# Patient Record
Sex: MALE | Race: WHITE | NOT HISPANIC OR LATINO | Employment: UNEMPLOYED | URBAN - METROPOLITAN AREA
[De-identification: names, ages, dates, MRNs, and addresses within clinical notes are randomized per-mention and may not be internally consistent; named-entity substitution may affect disease eponyms.]

---

## 2019-04-07 ENCOUNTER — HOSPITAL ENCOUNTER (EMERGENCY)
Facility: HOSPITAL | Age: 54
Discharge: HOME | End: 2019-04-07
Attending: EMERGENCY MEDICINE
Payer: OTHER MISCELLANEOUS

## 2019-04-07 VITALS
DIASTOLIC BLOOD PRESSURE: 81 MMHG | TEMPERATURE: 99 F | RESPIRATION RATE: 20 BRPM | BODY MASS INDEX: 25.77 KG/M2 | OXYGEN SATURATION: 98 % | SYSTOLIC BLOOD PRESSURE: 132 MMHG | HEIGHT: 70 IN | WEIGHT: 180 LBS | HEART RATE: 65 BPM

## 2019-04-07 DIAGNOSIS — G57.02 SCIATIC NERVE DISEASE, LEFT: Primary | ICD-10-CM

## 2019-04-07 PROCEDURE — 96372 THER/PROPH/DIAG INJ SC/IM: CPT

## 2019-04-07 PROCEDURE — 99284 EMERGENCY DEPT VISIT MOD MDM: CPT | Mod: 25

## 2019-04-07 PROCEDURE — 63600175 PHARM REV CODE 636 W HCPCS: Performed by: NURSE PRACTITIONER

## 2019-04-07 RX ORDER — KETOROLAC TROMETHAMINE 30 MG/ML
15 INJECTION, SOLUTION INTRAMUSCULAR; INTRAVENOUS
Status: COMPLETED | OUTPATIENT
Start: 2019-04-07 | End: 2019-04-07

## 2019-04-07 RX ORDER — ORPHENADRINE CITRATE 30 MG/ML
60 INJECTION INTRAMUSCULAR; INTRAVENOUS
Status: COMPLETED | OUTPATIENT
Start: 2019-04-07 | End: 2019-04-07

## 2019-04-07 RX ORDER — NAPROXEN 500 MG/1
500 TABLET ORAL 2 TIMES DAILY WITH MEALS
Qty: 60 TABLET | Refills: 0 | Status: SHIPPED | OUTPATIENT
Start: 2019-04-07

## 2019-04-07 RX ADMIN — KETOROLAC TROMETHAMINE 15 MG: 30 INJECTION, SOLUTION INTRAMUSCULAR at 10:04

## 2019-04-07 RX ADMIN — ORPHENADRINE CITRATE 60 MG: 60 INJECTION INTRAMUSCULAR; INTRAVENOUS at 10:04

## 2019-04-07 NOTE — ED PROVIDER NOTES
Encounter Date: 4/7/2019    SCRIBE #1 NOTE: I, Rajat Garza, am scribing for, and in the presence of, Michelle RIVERA.       History     Chief Complaint   Patient presents with    Hip Pain     moving beams thursday reports lt hip and leg pain     Time seen by provider: 9:39 AM on 04/07/2019      Porter Yip is a 54 y.o. male with a PMHx of prostate cancer who presents to the ED for further evaluation of left hip pain that started 3 days ago. He states that he lifted a beam 3 days ago and when lifting felt a pinch in his left hip and buttock region. Patient reports that initially the pain was not intense and was minimal. He relays that it has gradually worsened and the pain is in the left hip and buttock that radiates down into his foot. He states that he has some tingling in his left foot as well. Patient denies weakness, incontinence of urine or stool, fever, rash, or any other compliant. The patient has a PSHx of prostatectomy. No IVDA, no fevers.          The history is provided by the patient.     Review of patient's allergies indicates:  No Known Allergies  No past medical history on file.  No past surgical history on file.  No family history on file.  Social History     Tobacco Use    Smoking status: Not on file   Substance Use Topics    Alcohol use: Not on file    Drug use: Not on file     Review of Systems   Constitutional: Negative for fever.   HENT: Negative for congestion.    Respiratory: Negative for shortness of breath.    Cardiovascular: Negative for chest pain.   Gastrointestinal: Negative for abdominal pain, diarrhea, nausea and vomiting.   Genitourinary: Negative for difficulty urinating and enuresis.        No fecal or urinary incontinence   Musculoskeletal: Positive for arthralgias.   Skin: Negative for rash.   Neurological: Positive for numbness. Negative for weakness.   Hematological: Does not bruise/bleed easily.       Physical Exam     Initial Vitals [04/07/19 0935]   BP Pulse Resp  Temp SpO2   132/81 65 20 98.7 °F (37.1 °C) 98 %      MAP       --         Physical Exam    Nursing note and vitals reviewed.  Constitutional: Vital signs are normal. He appears well-developed and well-nourished.   HENT:   Head: Normocephalic and atraumatic.   Eyes: Pupils are equal, round, and reactive to light.   Neck: Neck supple.   Cardiovascular: Normal rate, regular rhythm, normal heart sounds and intact distal pulses. Exam reveals no gallop and no friction rub.    No murmur heard.  Pulses:       Dorsalis pedis pulses are 2+ on the left side.   Pulmonary/Chest: Breath sounds normal. He has no wheezes. He has no rhonchi. He has no rales.   Abdominal: Soft. Normal appearance and bowel sounds are normal. There is no tenderness.   Musculoskeletal: He exhibits tenderness.        Left hip: Normal.        Cervical back: Normal.        Thoracic back: Normal.        Lumbar back: He exhibits tenderness and pain. He exhibits normal range of motion, no bony tenderness, no swelling, no edema, no deformity, no laceration, no spasm and normal pulse.        Back:    Left paraspinal lumbar spinal tenderness. No C, T, and L midline spinal tenderness.    Neurological: He is alert and oriented to person, place, and time. He has normal strength.   5/5 strength and normal sensation in the bilateral lower extremities.   Skin: Skin is warm, dry and intact.   Psychiatric: He has a normal mood and affect. His speech is normal and behavior is normal.         ED Course   Procedures  Labs Reviewed - No data to display       Imaging Results    None          Medical Decision Making:   History:   Old Medical Records: I decided to obtain old medical records.  Differential Diagnosis:   Contusion  Lumbar strain  Sciatica   Lumbar radiculopathy       APC / Resident Notes:   Patient is a 54 y.o. male who presents to the ED 04/07/2019 who underwent emergent evaluation for left hip pain. Patient on exam is ambulatory with normal ROM of left hip with  no pain with ROM of left hip. Patient has pain and tenderness in left paraspinal lumbar spine radiating into buttocks consistent with sciatica. He has 5/5 strength and normal sensation in bi lower extremities. The patient has no history of major trauma.  The patient's age is not greater than 70 or less than 20 years old. I do not suspect an infectious, cancerous, or vascular etiology as the cause of the low back pain. The patient does not have a history of cancer or unexplained weight loss suggesting metastatic disease.  The patient does not have persistent fevers or night sweats. The patient is not immunocompromised. He denies IV drug use.  I do not think this patient has an epidural abscess, osteomyelitis, or metastatic spine lesions.  Pt does not have a history of  aortic aneurysm and I do not think there is evidence of retroperitoneal rupture.  The patient has a normal neurological exam and does not show evidence of cord or root compression.  The patient does not exhibit signs of urinary retention, urinary incontinence, bowel incontinence, or saddle anesthesia.  There is no evidence of cauda equine syndrome.      I do not think emergent radiography with X-rays or CT scan or MRI is warranted at this  time. Outpatient imaging can be obtained at the discretion of the primary care Physician.     Pt's symptoms were treated and improved with pain medication.     I will offer this patient symptomatic treatment, reassurance, and education. Discussed close follow up care with PCP; patient verbalized understanding; case discussed with Dr. Lundy who is agreeable to plan of care.      Pt agrees with assessment, disposition and treatment plan; all questions answered.               Scribe Attestation:   Scribe #1: I performed the above scribed service and the documentation accurately describes the services I performed. I attest to the accuracy of the note.    Attending Attestation:     Physician Attestation Statement for  NP/PA:   I discussed this assessment and plan of this patient with the NP/PA, but I did not personally examine the patient. The face to face encounter was performed by the NP/PA.        Physician Attestation for Scribe:  Physician Attestation Statement for Aliviaibe #1: I, Michelle Chandra, reviewed documentation, as scribed by in my presence, and it is both accurate and complete.     Comments: I, INOCENTE ChauhanC, personally performed the services described in this documentation. All medical record entries made by the scribe were at my direction and in my presence.  I have reviewed the chart and agree that the record reflects my personal performance and is accurate and complete. MIGUEL Chauhan.  9:21 PM 04/07/2019 e               Clinical Impression:       ICD-10-CM ICD-9-CM   1. Sciatic nerve disease, left G57.02 355.0         Disposition:   Disposition: Discharged  Condition: Stable                        Michelle Chandra NP  04/07/19 2122       David Lundy MD  04/09/19 1146

## 2019-05-28 ENCOUNTER — TELEPHONE (OUTPATIENT)
Dept: SPINE | Facility: CLINIC | Age: 54
End: 2019-05-28

## 2019-05-28 NOTE — TELEPHONE ENCOUNTER
----- Message from Coby Keen sent at 5/28/2019  9:43 AM CDT -----  Contact: self [phone]  [facility] is not offering an appt for him at all.  I think it is due to the insurance.  He has 2 lumbar herniated discs, he is in pain. He said his pain level is significant.  Please call him to schedule an appt.  He is faxing a copy of the MRI report and will bring the disc to the appt.  Thank you!

## 2019-05-28 NOTE — TELEPHONE ENCOUNTER
Spoke with patient and scheduled him an appointment to see Michelle Bernal 6-17-19, he indicated understanding.

## 2019-05-29 ENCOUNTER — OFFICE VISIT (OUTPATIENT)
Dept: SPINE | Facility: CLINIC | Age: 54
End: 2019-05-29
Payer: MEDICAID

## 2019-05-29 VITALS
SYSTOLIC BLOOD PRESSURE: 118 MMHG | DIASTOLIC BLOOD PRESSURE: 81 MMHG | BODY MASS INDEX: 25.79 KG/M2 | HEIGHT: 70 IN | WEIGHT: 180.13 LBS | HEART RATE: 83 BPM

## 2019-05-29 DIAGNOSIS — M54.16 LUMBAR RADICULOPATHY: ICD-10-CM

## 2019-05-29 DIAGNOSIS — M51.26 HERNIATED LUMBAR INTERVERTEBRAL DISC: Primary | ICD-10-CM

## 2019-05-29 PROCEDURE — 99999 PR PBB SHADOW E&M-EST. PATIENT-LVL IV: ICD-10-PCS | Mod: PBBFAC,,, | Performed by: PHYSICIAN ASSISTANT

## 2019-05-29 PROCEDURE — 99204 PR OFFICE/OUTPT VISIT, NEW, LEVL IV, 45-59 MIN: ICD-10-PCS | Mod: S$PBB,,, | Performed by: PHYSICIAN ASSISTANT

## 2019-05-29 PROCEDURE — 99999 PR PBB SHADOW E&M-EST. PATIENT-LVL IV: CPT | Mod: PBBFAC,,, | Performed by: PHYSICIAN ASSISTANT

## 2019-05-29 PROCEDURE — 99204 OFFICE O/P NEW MOD 45 MIN: CPT | Mod: S$PBB,,, | Performed by: PHYSICIAN ASSISTANT

## 2019-05-29 PROCEDURE — 99214 OFFICE O/P EST MOD 30 MIN: CPT | Mod: PBBFAC,PN | Performed by: PHYSICIAN ASSISTANT

## 2019-05-29 RX ORDER — CYCLOBENZAPRINE HCL 5 MG
5 TABLET ORAL
COMMUNITY
Start: 2019-05-09 | End: 2019-06-06 | Stop reason: ALTCHOICE

## 2019-05-29 RX ORDER — METHYLPREDNISOLONE 4 MG/1
TABLET ORAL
Qty: 1 PACKAGE | Refills: 0 | Status: SHIPPED | OUTPATIENT
Start: 2019-05-29 | End: 2019-06-19

## 2019-05-29 RX ORDER — GABAPENTIN 400 MG/1
CAPSULE ORAL
COMMUNITY
Start: 2019-05-09 | End: 2021-05-25 | Stop reason: CLARIF

## 2019-05-29 RX ORDER — OMEPRAZOLE 20 MG/1
CAPSULE, DELAYED RELEASE ORAL
COMMUNITY
Start: 2019-05-13

## 2019-05-29 RX ORDER — IBUPROFEN 800 MG/1
800 TABLET ORAL 3 TIMES DAILY
COMMUNITY
Start: 2019-05-09

## 2019-05-30 ENCOUNTER — CLINICAL SUPPORT (OUTPATIENT)
Dept: REHABILITATION | Facility: HOSPITAL | Age: 54
End: 2019-05-30
Payer: MEDICAID

## 2019-05-30 DIAGNOSIS — M51.26 HERNIATED LUMBAR INTERVERTEBRAL DISC: Primary | ICD-10-CM

## 2019-05-30 DIAGNOSIS — M54.16 LUMBAR RADICULOPATHY: ICD-10-CM

## 2019-05-30 PROCEDURE — 97161 PT EVAL LOW COMPLEX 20 MIN: CPT | Mod: PN

## 2019-05-30 NOTE — PLAN OF CARE
TIME RECORD    Date: 05/30/2019    Start Time:  0800  Stop Time:  0845    PROCEDURES:    TIMED  Procedure Time Min.    Start:  Stop:     Start:  Stop:     Start:  Stop:     Start:  Stop:          UNTIMED  Procedure Time Min.   EVAL Start:  Stop:     Start:  Stop:      Total Timed Minutes:    Total Timed Units:    Total Untimed Units:  1  Charges Billed/# of units:  1    OUTPATIENT PHYSICAL THERAPY   PATIENT EVALUATION  Onset Date: 4/4/19 DOI.  Primary Diagnosis:   1. Herniated lumbar intervertebral disc     2. Lumbar radiculopathy       Treatment Diagnosis: LBP.  No past medical history on file.  Precautions: Hx of Ca,no US.  Prior Therapy: No.  Medications: Porter Yip has a current medication list which includes the following prescription(s): cyclobenzaprine, gabapentin, ibuprofen, methylprednisolone, naproxen, and omeprazole.  Nutrition:  Normal  History of Present Illness: Pt suffered low back injury by pushing heavy object on the floor at work on 4/4/19.  Prior Level of Function: Independent  Social History: Not working.  Place of Residence (Steps/Adaptations): In [address] house with parents.  Functional Deficits Leading to Referral/Nature of Injury: Difficulties with ADLs,functional activities,home making,self care.  Patient Therapy Goals: Decrease pain.    Subjective     Porter Yip states Left side hurts more..    Pain:  Location: back   Description: Dull, Burning, Throbbing, Tingling, Numb, Sharp and Variable  Activities Which Increase Pain: Sitting, Standing, Bending, Walking, Extension, Flexing and Lifting  Activities Which Decrease Pain: relaxation, pain medication and rest  Pain Scale: 3/10 at best 5/10 now  9/10 at worst    Objective     Posture: Head forward,round shoulders.  Palpation: L3-S1 paraspinals tender.  Sensation: Intact.  DTRs:  Range of Motion/Strength:   Cervical/Thoracic/Lumbar AROM: Pain/Dysfunction with Movement:   Flexion   30    Extension    15    Right side bending   20     Left side bending   20    Right rotation    10    Left rotation    10      Strength of LS is grossly 3-/5 in all planes.    Flexibility: Both HS tight.  Gait: Without AD  Analysis: SBA - guarded.  Bed Mobility:Independent  Transfers: Independent  Special Tests: Slump test neg. SLR LT;80, RT;80 neg bilaterally.  Other: OSWESTRY 37/50 74% IMPAIRED.   Treatment: EVAL.    Assessment       Initial Assessment (Pertinent finding, problem list and factors affecting outcome): Pt presents ROM and strength deficits,decreased functional status due to pain and above listed deficits.  Rehab Potiential: good    Short Term Goals (3 Weeks): 1.Decrease pain x 1 grade. 2.Start HEP.  Long Term Goals (6 Weeks): 1.Pain 0-4/10. 2.Independent HEP. 3.ROM WFL/WNL. 4.Strength 5/5.5.OSWESTRY 20/50 6.Restore previous JESUS.    Plan     Certification Period: 5/30/19 to 7/30/19  Recommended Treatment Plan: 2 times per week for 6 weeks 12 visits.: Cervical/Lumbar Traction, Electrical Stimulation PRN, Manual Therapy, Moist Heat/ Ice, Patient Education, Therapeutic Activites and Therapeutic Exercise  Other Recommendations: Dry needling PRN. IMS PRN.      Therapist: Porter Crawley, PT    I CERTIFY THE NEED FOR THESE SERVICES FURNISHED UNDER THIS PLAN OF TREATMENT AND WHILE UNDER MY CARE    Physician's comments: ________________________________________________________________________________________________________________________________________________      Physician's Name: ___________________________________

## 2019-05-31 NOTE — PROGRESS NOTES
Neurosurgery History & Physical    Patient ID: Porter Yip is a 54 y.o. male.    Chief Complaint   Patient presents with    Low-back Pain     He has had low back pain since 4-4-19 after moving some heavy equiptment. Pain is constant and radiates down his left buttock and down his left leg. He has numbness and tingling in his left calf and tingling and numbness in the bottom of his left foot. The accident happened on a Thursday and by Sunday he had to be seen in the ER at [facility]. He has been treated by [email or web address] and they told him he needed to be seen by someone for the spine. Nothing seems to help pain long term. Pain has been getti       Review of Systems   Constitutional: Negative for activity change, chills, fatigue and unexpected weight change.   HENT: Negative for hearing loss, tinnitus, trouble swallowing and voice change.    Eyes: Negative for visual disturbance.   Respiratory: Negative for apnea, chest tightness and shortness of breath.    Cardiovascular: Negative for chest pain and palpitations.   Gastrointestinal: Negative for abdominal pain, constipation, diarrhea, nausea and vomiting.   Genitourinary: Negative for difficulty urinating, dysuria and frequency.   Musculoskeletal: Positive for back pain and myalgias. Negative for gait problem, neck pain and neck stiffness.   Skin: Negative for wound.   Neurological: Positive for numbness. Negative for dizziness, tremors, seizures, facial asymmetry, speech difficulty, weakness, light-headedness and headaches.   Psychiatric/Behavioral: Negative for confusion and decreased concentration.       No past medical history on file.  Social History     Socioeconomic History    Marital status:      Spouse name: Not on file    Number of children: Not on file    Years of education: Not on file    Highest education level: Not on file   Occupational History    Not on file   Social Needs    Financial resource strain: Not on file    Food  "insecurity:     Worry: Not on file     Inability: Not on file    Transportation needs:     Medical: Not on file     Non-medical: Not on file   Tobacco Use    Smoking status: Never Smoker   Substance and Sexual Activity    Alcohol use: Not on file    Drug use: Not on file    Sexual activity: Not on file   Lifestyle    Physical activity:     Days per week: Not on file     Minutes per session: Not on file    Stress: Not on file   Relationships    Social connections:     Talks on phone: Not on file     Gets together: Not on file     Attends Islam service: Not on file     Active member of club or organization: Not on file     Attends meetings of clubs or organizations: Not on file     Relationship status: Not on file   Other Topics Concern    Not on file   Social History Narrative    Not on file     No family history on file.  Review of patient's allergies indicates:  No Known Allergies    Current Outpatient Medications:     cyclobenzaprine (FLEXERIL) 5 MG tablet, Take 5 mg by mouth as needed., Disp: , Rfl:     gabapentin (NEURONTIN) 400 MG capsule, , Disp: , Rfl:     ibuprofen (ADVIL,MOTRIN) 800 MG tablet, Take 800 mg by mouth 3 (three) times daily., Disp: , Rfl:     omeprazole (PRILOSEC) 20 MG capsule, , Disp: , Rfl:     methylPREDNISolone (MEDROL, RIANNA,) 4 mg tablet, use as directed, Disp: 1 Package, Rfl: 0    naproxen (NAPROSYN) 500 MG tablet, Take 1 tablet (500 mg total) by mouth 2 (two) times daily with meals., Disp: 60 tablet, Rfl: 0    Vitals:    05/29/19 1403   BP: 118/81   BP Location: Right arm   Patient Position: Standing   BP Method: Medium (Automatic)   Pulse: 83   Weight: 81.7 kg (180 lb 1.9 oz)   Height: 5' 10" (1.778 m)       Physical Exam   Constitutional: He is oriented to person, place, and time. He appears well-developed and well-nourished.   HENT:   Head: Normocephalic and atraumatic.   Eyes: Pupils are equal, round, and reactive to light.   Neck: Normal range of motion. Neck " supple.   Cardiovascular: Normal rate.   Pulmonary/Chest: Effort normal.   Abdominal: He exhibits no distension.   Musculoskeletal: Normal range of motion. He exhibits no edema.   Neurological: He is alert and oriented to person, place, and time. He has a normal Finger-Nose-Finger Test, a normal Heel to Shin Test, a normal Romberg Test and a normal Tandem Gait Test. Gait normal.   Reflex Scores:       Tricep reflexes are 2+ on the right side and 2+ on the left side.       Bicep reflexes are 2+ on the right side and 2+ on the left side.       Brachioradialis reflexes are 2+ on the right side and 2+ on the left side.       Patellar reflexes are 2+ on the right side and 2+ on the left side.       Achilles reflexes are 2+ on the right side and 2+ on the left side.  Skin: Skin is warm and dry.   Psychiatric: He has a normal mood and affect. His speech is normal and behavior is normal. Judgment and thought content normal.   Nursing note and vitals reviewed.      Neurologic Exam     Mental Status   Oriented to person, place, and time.   Oriented to person.   Oriented to place.   Oriented to time.   Follows 3 step commands.   Attention: normal. Concentration: normal.   Speech: speech is normal   Level of consciousness: alert  Knowledge: consistent with education.   Able to name object. Able to read. Able to repeat. Able to write. Normal comprehension.     Cranial Nerves     CN II   Visual acuity: normal  Right visual field deficit: none  Left visual field deficit: none     CN III, IV, VI   Pupils are equal, round, and reactive to light.  Right pupil: Size: 3 mm. Shape: regular. Reactivity: brisk. Consensual response: intact.   Left pupil: Size: 3 mm. Shape: regular. Reactivity: brisk. Consensual response: intact.   CN III: no CN III palsy  CN VI: no CN VI palsy  Nystagmus: none   Diplopia: none  Ophthalmoparesis: none  Conjugate gaze: present    CN V   Right facial sensation deficit: none  Left facial sensation deficit:  none    CN VII   Right facial weakness: none  Left facial weakness: none    CN VIII   Hearing: intact    CN IX, X   CN IX normal.   CN X normal.     CN XI   Right sternocleidomastoid strength: normal  Left sternocleidomastoid strength: normal  Right trapezius strength: normal  Left trapezius strength: normal    CN XII   Fasciculations: absent  Tongue deviation: none    Motor Exam   Muscle bulk: normal  Overall muscle tone: normal  Right arm pronator drift: absent  Left arm pronator drift: absent    Strength   Right neck flexion: 5/5  Left neck flexion: 5/5  Right neck extension: 5/5  Left neck extension: 5/5  Right deltoid: 5/5  Left deltoid: 5/5  Right biceps: 5/5  Left biceps: 5/5  Right triceps: 5/5  Left triceps: 5/5  Right wrist flexion: 5/5  Left wrist flexion: 5/5  Right wrist extension: 5/5  Left wrist extension: 5/5  Right interossei: 5/5  Left interossei: 5/5  Right abdominals: 5/5  Left abdominals: 5/5  Right iliopsoas: 5/5  Left iliopsoas: 5/5  Right quadriceps: 5/5  Left quadriceps: 5/5  Right hamstrin/5  Left hamstrin/5  Right glutei: 5/5  Left glutei: 5/5  Right anterior tibial: 5/5  Left anterior tibial: 5/5  Right posterior tibial: 5/5  Left posterior tibial: 5/5  Right peroneal: 5/5  Left peroneal: 5/5  Right gastroc: 5/5  Left gastroc: 5/5Plantar flexion 5/5 while seated, but there is weakness detected with attempting to toe walk.     Sensory Exam   Right arm light touch: normal  Left arm light touch: normal  Right leg light touch: normal  Left leg light touch: normal  Right arm vibration: normal  Left arm vibration: normal  Right arm pinprick: normal  Left arm pinprick: normal    Gait, Coordination, and Reflexes     Gait  Gait: normal    Coordination   Romberg: negative  Finger to nose coordination: normal  Heel to shin coordination: normal  Tandem walking coordination: normal    Tremor   Resting tremor: absent  Intention tremor: absent  Action tremor: absent    Reflexes   Right  brachioradialis: 2+  Left brachioradialis: 2+  Right biceps: 2+  Left biceps: 2+  Right triceps: 2+  Left triceps: 2+  Right patellar: 2+  Left patellar: 2+  Right achilles: 2+  Left achilles: 2+  Right Swenson: absent  Left Swenson: absent  Right ankle clonus: absent  Left ankle clonus: absent      Provider dictation:  54 year old male who is relatively healthy is self referred for evaluation of lower back and left leg pain.  He was injured while moving heavy equipment at work 4-4-19.  He was moving ortega beams and felt a sharp pain in the lower back.  He initially filed under workers comp, but was denied.  He presents on private insurance today.  He has pain in the lower back with radiation to the left buttock and left leg.  He feels numbness/ tingling in the left calf and plantar surface of the foot.  He was seen in the ER due to severity of pain.  He has tried flexeril, gabapentin, advil and naproxen.  He has not had PT or CORTNEY.  He paid out of pocket for an MRI lumbar spine.   Oswestry score: 64%.  PHQ:  6.    On exam, he has full 5/5 strength while seated throughout, but is unable to toe walk on the left side.  He has 2+ muscle stretch reflexes throughout and no sensory deficits.    I have reviewed an MRI report (images would not load properly) and it describes an L4/5 broad based disk with right greater than left foraminal narrowing.  L5/S1 central left paracentral disk hernia contacting the left S1 nerve.      Mr. Yip has acute onset lower back and left leg radiculopathy likely due to left L5/S1 disc hernia described on report.  I am prescribing an oral steroid to help with pain and referring to PT.  His insurance does not cover pain management/ CORTNEY.  Once the images are reviewed I will discuss with our surgeons and determine if they will be willing to proceed with surgical intervention.  In the case that our surgeons do not accept the patient, I will make arrangements now for him to be seen at [facility] as it  will likely be a few months away.  Follow up with me in clinic in 8 weeks for further evaluation.    Visit Diagnosis:  Herniated lumbar intervertebral disc  -     Ambulatory Referral to Neurosurgery  -     methylPREDNISolone (MEDROL, RIANNA,) 4 mg tablet; use as directed  Dispense: 1 Package; Refill: 0  -     Ambulatory Referral to Physical/Occupational Therapy    Lumbar radiculopathy  -     Ambulatory Referral to Neurosurgery  -     methylPREDNISolone (MEDROL, RIANNA,) 4 mg tablet; use as directed  Dispense: 1 Package; Refill: 0  -     Ambulatory Referral to Physical/Occupational Therapy        Total time spent counseling greater than fifty percent of total visit time.  Counseling included discussion regarding imaging findings, diagnosis possibilities, treatment options, risks and benefits.   The patient had many questions regarding the options and long-term effects.

## 2019-06-03 ENCOUNTER — CLINICAL SUPPORT (OUTPATIENT)
Dept: REHABILITATION | Facility: HOSPITAL | Age: 54
End: 2019-06-03
Attending: PHYSICIAN ASSISTANT
Payer: MEDICAID

## 2019-06-03 DIAGNOSIS — M54.16 LUMBAR RADICULOPATHY: ICD-10-CM

## 2019-06-03 DIAGNOSIS — M51.26 HERNIATED LUMBAR INTERVERTEBRAL DISC: Primary | ICD-10-CM

## 2019-06-03 PROCEDURE — 97110 THERAPEUTIC EXERCISES: CPT | Mod: PN

## 2019-06-03 PROCEDURE — 97140 MANUAL THERAPY 1/> REGIONS: CPT | Mod: PN

## 2019-06-03 NOTE — PROGRESS NOTES
"[id number]  Date of Treatment: 06/03/2019   Diagnosis:   Encounter Diagnoses   Name Primary?    Herniated lumbar intervertebral disc Yes    Lumbar radiculopathy        Time in: 0704  Time Out: 0800  Total Treatment Time: 56        Subjective:    Porter Tan reports "I am johnathon sore now, I wasn't earlier, but sitting down makes me hurt more, and I hurt in my hamstrings."  Patient reports their pain to be 5/10 on a 0-10 scale with 0 being no pain and 10 being the worst pain imaginable.    Objective  Porter Tan received therapeutic exercises to develop strength, endurance, ROM, flexibility, posture and core stabilization for 40 minutes including:   NuStep 10' L4  Hamstring stretch 3/30s at edge of mat L/R  Calf stretch 3/30s R/L  Piriformis stretch 3/30s R/L  SKTC 3/10s  Sciatic nerve floss x 10  LTR with tball 3/10  DKTC with tball 3/10    Manual therapy with theraball and therabar x 15' to B hamstrings, piriformis, and glutes to decrease trigger points and pain referring into feet    Written Home Exercises Provided: HS stretch, calf stretch, and piriformis stretch for HEP with written and pic  Pt demo good understanding of the education provided. Porter Tan demonstrated good return demonstration of activities.     Assessment:   Patient actively listening to cues and advice on posture control.  Very tight hamstrings, calves, and piriformis muscles, c/o numbness and tingling into B feet. TC's and VC's with added therex.    Pt will continue to benefit from skilled PT intervention. Medical Necessity is demonstrated by:  Continued inability to participate in vocational pursuits, Pain limits function of effected part for some activities, Requires skilled supervision to complete and progress HEP and Weakness.    Patient is making fair progress towards established goals.      Plan:  Continue with established Plan of Care towards PT goals.   "

## 2019-06-05 ENCOUNTER — CLINICAL SUPPORT (OUTPATIENT)
Dept: REHABILITATION | Facility: HOSPITAL | Age: 54
End: 2019-06-05
Payer: MEDICAID

## 2019-06-05 DIAGNOSIS — M54.16 LUMBAR RADICULOPATHY: Primary | ICD-10-CM

## 2019-06-05 PROCEDURE — 97110 THERAPEUTIC EXERCISES: CPT | Mod: PN

## 2019-06-05 PROCEDURE — 97140 MANUAL THERAPY 1/> REGIONS: CPT | Mod: PN

## 2019-06-05 NOTE — PROGRESS NOTES
TIME RECORD    Date:  06/05/2019    Start Time:  0700  Stop Time:  0750    PROCEDURES:    TIMED  Procedure Time Min.   TE Start:0700  Stop:0712 12   MTT/DN Start:0720  Stop:0750 30    Start:  Stop:     Start:  Stop:          UNTIMED  Procedure Time Min.    Start:  Stop:     Start:  Stop:      Total Timed Minutes:  42  Total Timed Units:  3  Total Untimed Units:    Charges Billed/# of units:  3      Progress/Current Status    Subjective:     Patient ID: Porter Yip is a 54 y.o. male.  Diagnosis:   1. Lumbar radiculopathy       Pain: 5 /10  First session aggravated LB.Pt wants to try dry needling.    Objective:     TE x 12'. Nustep x 12' f/b dry needling to L2-S1 multifidus with IMS X 15'.  Piriformis,glut max.  HAs 10,16,18,24.    Assessment:     Tolerated DN very well.    Patient Education/Response:     Expect to be sore tonight.    Plans and Goals:     Cont per POC.

## 2019-06-06 ENCOUNTER — TELEPHONE (OUTPATIENT)
Dept: SPINE | Facility: CLINIC | Age: 54
End: 2019-06-06

## 2019-06-06 DIAGNOSIS — M54.16 LUMBAR RADICULOPATHY: Primary | ICD-10-CM

## 2019-06-06 RX ORDER — TIZANIDINE 4 MG/1
4 TABLET ORAL EVERY 8 HOURS PRN
Qty: 40 TABLET | Refills: 0 | Status: SHIPPED | OUTPATIENT
Start: 2019-06-06 | End: 2021-05-25 | Stop reason: CLARIF

## 2019-06-06 NOTE — TELEPHONE ENCOUNTER
Reviewed images and discussed with patient.  The L5/S1 disc is small.  I want him to proceed with PT.  I schanged his muscle relaxant.  I will discuss with surgeons here to see if they will consider his case if PT does not help.  If not he understands he will have to be seen by Dr. Magaña with [facility].

## 2019-06-06 NOTE — TELEPHONE ENCOUNTER
Spoke with patient and he said that after 2 appointments with PT he had an increase in his left hip and leg pain. He said the pain is now severe and he would like something for pain. I let him know he will have to contact his PCP for any pain medication because you can't write for that. The images have been uploaded from his disc that you could not view when he was in clinic to see you. He would like a call back regarding the results. Please advise.

## 2019-06-06 NOTE — TELEPHONE ENCOUNTER
----- Message from Cherry Quan sent at 6/6/2019  9:21 AM CDT -----  Contact: Patient  Type: Needs Medical Advice    Who Called:  Patient  Best Call Back Number: [phone]  Additional Information: Patient is stating that his left hip and leg is in pain. He would like to speak with the nurse.Please call back and advise.

## 2019-06-10 ENCOUNTER — CLINICAL SUPPORT (OUTPATIENT)
Dept: REHABILITATION | Facility: HOSPITAL | Age: 54
End: 2019-06-10
Attending: NURSE PRACTITIONER
Payer: MEDICAID

## 2019-06-10 DIAGNOSIS — M51.26 HERNIATED LUMBAR INTERVERTEBRAL DISC: Primary | ICD-10-CM

## 2019-06-10 DIAGNOSIS — M54.16 LUMBAR RADICULOPATHY: ICD-10-CM

## 2019-06-10 PROCEDURE — 97110 THERAPEUTIC EXERCISES: CPT | Mod: PN

## 2019-06-10 PROCEDURE — 97010 HOT OR COLD PACKS THERAPY: CPT | Mod: PN

## 2019-06-10 NOTE — PROGRESS NOTES
"Name: Porter Tan [facility] Number: [id number]  Date of Treatment: 06/10/2019   Diagnosis:   Encounter Diagnoses   Name Primary?    Herniated lumbar intervertebral disc Yes    Lumbar radiculopathy        Time in: 0703  Time Out: 0757  Total Treatment Time: 54      Subjective:    Porter Tan reports "I feel like anytime I sit down for a period of time, an hour later my back hurts intensly."  Patient states after dry needling last visit, numbness and tingling to toes stopped for a short period of time, however after a few hours all pain returned. Patient reports pain doesn't increase immediately, however, after hip flexion therex or sitting a bit pain increases. Patient reports their pain to be 5/10 on a 0-10 scale with 0 being no pain and 10 being the worst pain imaginable.    Objective  Porter Tan received therapeutic exercises to develop strength, endurance, ROM, flexibility, posture and core stabilization for 39 minutes including:     EFX 2'/2' RE/RA 5  Hamstring stretch 3/30s standing at EOM R/L  Piriformis stretch 3/30s standing at EOM R/L  Calf stretch 3/30s on 1/2 roll in // bars  Prone lying 2'  Prone on elbows 2'  Prone press ups 2 x 10  Prone hip ext 2 x 10  Passive IR/ER hip stretch on R/L LE's in prone 3/30s   Bridging DNT  Manual therapy x 5' to B glutes with advice for tennis ball at home to decrease piriformis tightness  Brittany post stretch 3/15s    CP x 10' to low back/glutes with patient in prone with pillow under hips.    Written Home Exercises Provided: Cont with stretches, modify to what while be comfortable for patient at home.  Pt demo good understanding of the education provided. Porter Tan demonstrated good return demonstration of activities.     Assessment:   Patient with decreased pain with prone therex vs therex with hips flexed.  Fair tolerance, continue to modify program to be tolerable for patient while meeting goals set in POC.  Piriformis tightness remains as well as shooting pain " down LE's.       Pt will continue to benefit from skilled PT intervention. Medical Necessity is demonstrated by:  Continued inability to participate in vocational pursuits, Pain limits function of effected part for some activities, Unable to participate fully in daily activities, Requires skilled supervision to complete and progress HEP and Weakness.    Patient is making fair progress towards established goals.    Plan:  Continue with established Plan of Care towards PT goals, decreasing pain and finding tolerable therex that do not increase pain post PT session.

## 2019-06-11 DIAGNOSIS — M54.16 LUMBAR RADICULOPATHY: Primary | ICD-10-CM

## 2019-06-11 NOTE — PROGRESS NOTES
I spoke with patient to follow up from his recent office visit.  Dr. Santa and Dr. Stallings have independently reviewed his imaging and case with the same recommendations.  The disk hernia at L5/S1 is very small and neithe rof them recommend surgical intervention.  The small disc hernia would not be expected to cause his degree of symptoms.  Both recommend emg/ ncv and continue with PT.    Patient understands.  I have also recommended that he can see Dr. Santos - pain management provider in [place] outside of [facility] who sees Medicaid if he would like.  He can also see Dr. Magaña at [facility] if he wants another opinion.      He wants to proceed with nerve testing.    Please schedule nerve testing and follow up after.

## 2019-06-12 ENCOUNTER — TELEPHONE (OUTPATIENT)
Dept: SPINE | Facility: CLINIC | Age: 54
End: 2019-06-12

## 2019-06-12 NOTE — TELEPHONE ENCOUNTER
Spoke with patient and scheduled him for an EMG 6-18-19 and a follow-up for results with Michelle Bernal 7-16-19, he indicated understanding.

## 2019-06-12 NOTE — TELEPHONE ENCOUNTER
----- Message from Michelle Bernal PA-C sent at 6/11/2019  4:52 PM CDT -----  Please see note attached to encounter - schedule emg and clinic follow up.

## 2019-06-13 ENCOUNTER — CLINICAL SUPPORT (OUTPATIENT)
Dept: REHABILITATION | Facility: HOSPITAL | Age: 54
End: 2019-06-13
Payer: MEDICAID

## 2019-06-13 DIAGNOSIS — M51.26 HERNIATED LUMBAR INTERVERTEBRAL DISC: Primary | ICD-10-CM

## 2019-06-13 DIAGNOSIS — M54.16 LUMBAR RADICULOPATHY: ICD-10-CM

## 2019-06-13 PROCEDURE — 97110 THERAPEUTIC EXERCISES: CPT | Mod: PN

## 2019-06-13 PROCEDURE — 97010 HOT OR COLD PACKS THERAPY: CPT | Mod: PN

## 2019-06-13 NOTE — PROGRESS NOTES
"Name: Porter Tan [facility] Number: [id number]  Date of Treatment: 06/13/2019   Diagnosis:   Encounter Diagnoses   Name Primary?    Herniated lumbar intervertebral disc Yes    Lumbar radiculopathy        Time in: 0900  Time Out: 1000  Total Treatment Time: 60  Group Time: 0      Subjective:    Porter Tan reports continued pain that radiates down L LE.  Patient reports their pain to be 6/10 on a 0-10 scale with 0 being no pain and 10 being the worst pain imaginable.    Objective    Porter Tan received therapeutic exercises to develop strength, endurance, flexibility and core stabilization for 50 minutes including:     Hamstring stretch 3x30" supine with strap with A on L  Piriformis stretch 3x30" R/L knee to opposite shoulder  PPT with ball squeeze x30  Sciatic nerve glide 3x10 with LE's on Tball  LTR x30 with Tball  DKTC x30 with Tball  MET's of isometrics of hip motion A/P (opposing) ,lateral B 3" x5  Ball squeeze x30  SLR 2x10 R/L    Cold pack to low back 10' while prone to decrease delayed onset muscle soreness    Pt demo good understanding of the education provided. Porter Tan demonstrated good return demonstration of activities with cues for direction and proper form.  Patient reported pain more centralized with supine exercises.    Assessment:     Pt will continue to benefit from skilled PT intervention. Medical Necessity is demonstrated by:  Pain limits function of effected part for some activities, Unable to participate fully in daily activities, Requires skilled supervision to complete and progress HEP and Weakness.    Plan:    Continue with established Plan of Care towards PT goals.   "

## 2019-06-17 ENCOUNTER — CLINICAL SUPPORT (OUTPATIENT)
Dept: REHABILITATION | Facility: HOSPITAL | Age: 54
End: 2019-06-17
Attending: PHYSICIAN ASSISTANT
Payer: MEDICAID

## 2019-06-17 DIAGNOSIS — M54.16 LUMBAR RADICULOPATHY: ICD-10-CM

## 2019-06-17 DIAGNOSIS — M51.26 HERNIATED LUMBAR INTERVERTEBRAL DISC: Primary | ICD-10-CM

## 2019-06-17 NOTE — PROGRESS NOTES
"Name: Porter Tan [facility] Number: [id number]  Date of Treatment: 06/17/2019   Diagnosis:   Encounter Diagnoses   Name Primary?    Herniated lumbar intervertebral disc Yes    Lumbar radiculopathy        Time in: 0705  Time Out: 0810  Total Treatment Time: 65      Subjective:    Porter Tan reports "Everytime after I leave here, or do anything where I am sitting at 90* for a period of time my pain is almost unbearable after about an hour of doing whatever it is."  Patient reports their pain to be 4/10 on a 0-10 scale with 0 being no pain and 10 being the worst pain imaginable.    Objective  Porter Tan received therapeutic exercises to develop strength, endurance, ROM, flexibility, posture and core stabilization for 55 minutes including:    Hamstring stretch 3/30s standing at EOM R/L   Piriformis stretch 3/30s in supine   Calf stretch 3/30s on 1/2 roll in // bars   Prone lying 2'   Prone on elbows 2'   Prone press ups 3 x 10   Prone hip ext 3 x 10   Prone alt UE/LE 3 x 10   Passive IR/ER hip stretch on R/L LE's in prone 3/30s      CP x 10' to low back/glutes with patient in prone with pillow under hips.    Written Home Exercises Provided: Cont with prone lying and stretching before getting out of bed; HEP given with prone therex.  Pt demo good understanding of the education provided. Porter Tan demonstrated good return demonstration of activities.     Assessment:   Patient continuing to be symptomatic after therapy sessions, or anything involving hips at 90*.  Lying supine, patient had increased tingling into L foot.  Patient with "baseline" pain after completing therex.  Decreased symptoms during todays therex.    Pt will continue to benefit from skilled PT intervention. Medical Necessity is demonstrated by:  Continued inability to participate in vocational pursuits, Pain limits function of effected part for some activities, Unable to participate fully in daily activities, Requires skilled supervision to complete " and progress HEP and Weakness.    Patient is making fair progress towards established goals.    Plan:  Continue with established Plan of Care towards PT goals; continue with prone therex and progress as tolerated.

## 2019-06-18 ENCOUNTER — OFFICE VISIT (OUTPATIENT)
Dept: PHYSICAL MEDICINE AND REHAB | Facility: CLINIC | Age: 54
End: 2019-06-18
Payer: MEDICAID

## 2019-06-18 DIAGNOSIS — M54.16 LUMBAR RADICULOPATHY: ICD-10-CM

## 2019-06-18 PROCEDURE — 99499 UNLISTED E&M SERVICE: CPT | Mod: S$PBB,,, | Performed by: PHYSICAL MEDICINE & REHABILITATION

## 2019-06-18 PROCEDURE — 95910 NRV CNDJ TEST 7-8 STUDIES: CPT | Mod: 26,S$PBB,, | Performed by: PHYSICAL MEDICINE & REHABILITATION

## 2019-06-18 PROCEDURE — 95910 PR NERVE CONDUCTION STUDY; 7-8 STUDIES: ICD-10-PCS | Mod: 26,S$PBB,, | Performed by: PHYSICAL MEDICINE & REHABILITATION

## 2019-06-18 PROCEDURE — 95886 MUSC TEST DONE W/N TEST COMP: CPT | Mod: 26,S$PBB,, | Performed by: PHYSICAL MEDICINE & REHABILITATION

## 2019-06-18 PROCEDURE — 95886 MUSC TEST DONE W/N TEST COMP: CPT | Mod: PBBFAC,PN | Performed by: PHYSICAL MEDICINE & REHABILITATION

## 2019-06-18 PROCEDURE — 95910 NRV CNDJ TEST 7-8 STUDIES: CPT | Mod: PBBFAC,PN | Performed by: PHYSICAL MEDICINE & REHABILITATION

## 2019-06-18 PROCEDURE — 95886 PR EMG COMPLETE, W/ NERVE CONDUCTION STUDIES, 5+ MUSCLES: ICD-10-PCS | Mod: 26,S$PBB,, | Performed by: PHYSICAL MEDICINE & REHABILITATION

## 2019-06-18 PROCEDURE — 99499 NO LOS: ICD-10-PCS | Mod: S$PBB,,, | Performed by: PHYSICAL MEDICINE & REHABILITATION

## 2019-06-18 NOTE — LETTER
June 18, 2019      Michelle Bernal PA-C  [address] [facility] [address]  [address]  [facility] [address]           [facility] [facility]  [address] [place] [address] [address]  [facility] [address]  Phone: [phone]  Fax: [phone]          Patient: Porter Yip   MR Number: [id number]   YOB: 1965   Date of Visit: 6/18/2019       Dear Michelle Bernal:    Thank you for referring Porter Yip to me for evaluation. Attached you will find relevant portions of my assessment and plan of care.    If you have questions, please do not hesitate to call me. I look forward to following Porter Yip along with you.    Sincerely,    Talia Forbes,     Enclosure  CC:  No Recipients    If you would like to receive this communication electronically, please contact [email or web address] or [phone] to request more information on "[employer] Link access.    For providers and/or their staff who would like to refer a patient to [facility], please contact us through our one-stop-shop provider referral line, [facility] , at [phone].    If you feel you have received this communication in error or would no longer like to receive these types of communications, please e-mail [email or web address]

## 2019-06-18 NOTE — PROGRESS NOTES
[facility]  Talia Forbes D.O.  Physical Medicine and Rehab  Phone: [phone]  Fax: [phone]    Neurography & Electromyography Report              Patient: Porter Yip   Patient ID: [id number]   Sex:     Date of Birth:     Age:     Notes:     Last visit date: 6/18/2019             Visit date and time: 6/18/2019 11:53   Patient Age on Visit Date:     Referring Physician:     Diagnoses:               Sensory NCS      Nerve / Sites Rec. Site Onset Lat Peak Lat Ref. NP Amp Ref. PP Amp Ref. Segments Distance Velocity     ms ms ms µV µV µV µV  cm m/s   R Sural - Ankle (Calf)      Calf Ankle 2.97 3.85 ?4.20 9.6 ?5.0 19.2 ?5.0 Calf - Ankle 14 47   L Sural - Ankle (Calf)      Calf Ankle 3.65 4.06 ?4.20 5.3 ?5.0 6.7 ?5.0 Calf - Ankle 14 38   R Superficial peroneal - Ankle      Lat leg Ankle 1.04 1.35 ?4.40 7.8 ?5.0 36.9 ?5.0 Lat leg - Ankle 14 134   L Superficial peroneal - Ankle      Lat leg Ankle 1.88 2.50 ?4.40 19.8 ?5.0 85.5 ?5.0 Lat leg - Ankle 14 75           Motor NCS      Nerve / Sites Muscle Latency Ref. Amplitude Ref. Amp % Duration Segments Distance Lat Diff Velocity Ref.     ms ms mV mV % ms  cm ms m/s m/s   R Peroneal - EDB      Ankle EDB 5.00 ?6.20 2.3 ?2.0 100 6.09 Ankle - EDB 8         Fib head EDB 11.98  8.1  355 6.67 Fib head - Ankle 33 6.98 47 ?39      Pop fossa EDB 13.23  8.1  354 6.61 Pop fossa - Fib head 10 1.25 80 ?39   L Peroneal - EDB      Ankle EDB 4.27 ?6.20 8.7 ?2.0 100 6.56 Ankle - EDB 8         Fib head EDB 12.24  7.6  88.1 7.29 Fib head - Ankle 34.5 7.97 43 ?39      Pop fossa EDB 14.01  7.6  87.6  Pop fossa - Fib head 10 1.77 56 ?39   R Tibial - AH      Ankle AH 3.70 ?6.00 17.8 ?3.0 100 6.30 Ankle - AH 8         Pop fossa AH 13.33  1.1  6.13  Pop fossa - Ankle 39 9.64 40 ?39   L Tibial - AH      Ankle AH 3.54 ?6.00 14.6 ?3.0 100 6.09 Ankle - AH 8         Pop fossa AH 10.57  1.2  8.44  Pop fossa - Ankle 36 7.03 51 ?39           F  Wave      Nerve Fmin Ref.    ms ms   R Tibial  - AH 57.45 ?58.00   L Tibial - AH 55.89 ?58.00           EMG Summary Table     Spontaneous MUAP Recruitment   Muscle IA Fib PSW Fasc H.F. Amp Dur. PPP Pattern   L. Vastus lateralis N None None None None 1+ 1+ 1+ Reduced   R. Vastus lateralis N None None None None 1+ 1+ 1+ Reduced   L. Biceps femoris (short head) N None None None None 1+ 1+ 1+ Reduced   R. Biceps femoris (short head) N None None None None 1+ 1+ 1+ Reduced   L. Rectus femoris N None None None None 1+ 1+ 1+ Reduced   R. Rectus femoris N None None None None 1+ 1+ 1+ Reduced   L. Tibialis anterior N None None None None 1+ 1+ 1+ Reduced   R. Tibialis anterior N None None None None 1+ 1+ 1+ Reduced   L. Gastrocnemius (Medial head) N None None None None N N N N   R. Gastrocnemius (Medial head) N None None None None N N N N   L. Gluteus medius N None None None None 1+ 1+ 1+ Reduced   R. Gluteus medius N None None None None 1+ 1+ 1+ Reduced   L. Lumbar paraspinals (mid) 1+ None None None None N N N N   R. Lumbar paraspinals (mid) 1+ None None None None N N N N   L. Lumbar paraspinals (low) N None None None None N N N N   R. Lumbar paraspinals (low) N None None None None N N N N           Summary    The motor conduction test was normal in all 4 of the tested nerves: R Peroneal - EDB, L Peroneal - EDB, R Tibial - AH, L Tibial - AH.    The sensory conduction test was normal in all 4 of the tested nerves: R Sural - Ankle (Calf), L Sural - Ankle (Calf), R Superficial peroneal - Ankle, L Superficial peroneal - Ankle.    The F wave study was normal in all 2 of the tested nerves: R Tibial - AH, L Tibial - AH.    The needle EMG examination was performed in 16 muscles. It was normal in 4 muscle(s): L. Gastrocnemius (Medial head), R. Gastrocnemius (Medial head), L. Lumbar paraspinals (low), R. Lumbar paraspinals (low). The study was abnormal in 12 muscle(s), with the following distribution:   Abnormal spontaneous/insertional activity was found in L. Lumbar paraspinals  (mid), R. Lumbar paraspinals (mid).   The MUP waveform abnormality was found in L. Vastus lateralis, R. Vastus lateralis, L. Biceps femoris (short head), R. Biceps femoris (short head), L. Rectus femoris, R. Rectus femoris, L. Tibialis anterior, R. Tibialis anterior, L. Gluteus medius, R. Gluteus medius.   Abnormal interference pattern was found in L. Vastus lateralis, R. Vastus lateralis, L. Biceps femoris (short head), R. Biceps femoris (short head), L. Rectus femoris, R. Rectus femoris, L. Tibialis anterior, R. Tibialis anterior, L. Gluteus medius, R. Gluteus medius.              Conclusion:     Mild to moderate chronic bilateral L4/L5 radiculopathies with NO active denervation          ____________________________  Talia Forbes D.O.

## 2019-07-01 ENCOUNTER — CLINICAL SUPPORT (OUTPATIENT)
Dept: REHABILITATION | Facility: HOSPITAL | Age: 54
End: 2019-07-01
Payer: MEDICAID

## 2019-07-01 DIAGNOSIS — M54.5 LOW BACK PAIN, UNSPECIFIED BACK PAIN LATERALITY, UNSPECIFIED CHRONICITY, WITH SCIATICA PRESENCE UNSPECIFIED: Primary | ICD-10-CM

## 2019-07-01 PROBLEM — M54.50 LOW BACK PAIN: Status: ACTIVE | Noted: 2019-07-01

## 2019-07-01 PROCEDURE — 97140 MANUAL THERAPY 1/> REGIONS: CPT | Mod: PN

## 2019-07-01 PROCEDURE — 97110 THERAPEUTIC EXERCISES: CPT | Mod: PN

## 2019-07-01 NOTE — PROGRESS NOTES
TIME RECORD    Date:  07/01/2019    Start Time:  0702  Stop Time:  0742    PROCEDURES:    TIMED  Procedure Time Min.   TE Start:0702  Stop:0710 8   MTT/DN Start:0712  Stop:0742 30    Start:  Stop:     Start:  Stop:          UNTIMED  Procedure Time Min.    Start:  Stop:     Start:  Stop:      Total Timed Minutes:  38  Total Timed Units:  3  Total Untimed Units:    Charges Billed/# of units:  3      Progress/Current Status    Subjective:     Patient ID: Porter Yip is a 54 y.o. male.  Diagnosis:   1. Low back pain, unspecified back pain laterality, unspecified chronicity, with sciatica presence unspecified       Pain: 6 /10  Pt wants DN,it helped previously.     Objective:     OH pulley x 8' f/b dry needling to L2-S1 with IMS X 15', HAs 10.14,16,18,24.    Assessment:     Tolerated DN very well.    Patient Education/Response:     Low JESUS.    Plans and Goals:     Cont per POC.

## 2019-07-16 ENCOUNTER — OFFICE VISIT (OUTPATIENT)
Dept: SPINE | Facility: CLINIC | Age: 54
End: 2019-07-16
Payer: MEDICAID

## 2019-07-16 VITALS
DIASTOLIC BLOOD PRESSURE: 72 MMHG | BODY MASS INDEX: 26.34 KG/M2 | WEIGHT: 184 LBS | HEIGHT: 70 IN | SYSTOLIC BLOOD PRESSURE: 109 MMHG | HEART RATE: 70 BPM

## 2019-07-16 DIAGNOSIS — M54.16 LUMBAR RADICULOPATHY: Primary | ICD-10-CM

## 2019-07-16 PROCEDURE — 99999 PR PBB SHADOW E&M-EST. PATIENT-LVL III: ICD-10-PCS | Mod: PBBFAC,,, | Performed by: PHYSICIAN ASSISTANT

## 2019-07-16 PROCEDURE — 99214 OFFICE O/P EST MOD 30 MIN: CPT | Mod: S$PBB,,, | Performed by: PHYSICIAN ASSISTANT

## 2019-07-16 PROCEDURE — 99213 OFFICE O/P EST LOW 20 MIN: CPT | Mod: PBBFAC,PN | Performed by: PHYSICIAN ASSISTANT

## 2019-07-16 PROCEDURE — 99214 PR OFFICE/OUTPT VISIT, EST, LEVL IV, 30-39 MIN: ICD-10-PCS | Mod: S$PBB,,, | Performed by: PHYSICIAN ASSISTANT

## 2019-07-16 PROCEDURE — 99999 PR PBB SHADOW E&M-EST. PATIENT-LVL III: CPT | Mod: PBBFAC,,, | Performed by: PHYSICIAN ASSISTANT

## 2019-07-16 NOTE — PROGRESS NOTES
Neurosurgery History & Physical    Patient ID: Porter Yip is a 54 y.o. male.    Chief Complaint   Patient presents with    Follow-up     EMG results and PT increased pain       Review of Systems   Constitutional: Negative for activity change, chills, fatigue and unexpected weight change.   HENT: Negative for hearing loss, tinnitus, trouble swallowing and voice change.    Eyes: Negative for visual disturbance.   Respiratory: Negative for apnea, chest tightness and shortness of breath.    Cardiovascular: Negative for chest pain and palpitations.   Gastrointestinal: Negative for abdominal pain, constipation, diarrhea, nausea and vomiting.   Genitourinary: Negative for difficulty urinating, dysuria and frequency.   Musculoskeletal: Positive for back pain and myalgias. Negative for gait problem, neck pain and neck stiffness.   Skin: Negative for wound.   Neurological: Positive for numbness. Negative for dizziness, tremors, seizures, facial asymmetry, speech difficulty, weakness, light-headedness and headaches.   Psychiatric/Behavioral: Negative for confusion and decreased concentration.       No past medical history on file.  Social History     Socioeconomic History    Marital status:      Spouse name: Not on file    Number of children: Not on file    Years of education: Not on file    Highest education level: Not on file   Occupational History    Not on file   Social Needs    Financial resource strain: Not on file    Food insecurity:     Worry: Not on file     Inability: Not on file    Transportation needs:     Medical: Not on file     Non-medical: Not on file   Tobacco Use    Smoking status: Never Smoker   Substance and Sexual Activity    Alcohol use: Not on file    Drug use: Not on file    Sexual activity: Not on file   Lifestyle    Physical activity:     Days per week: Not on file     Minutes per session: Not on file    Stress: Not on file   Relationships    Social connections:     Talks  "on phone: Not on file     Gets together: Not on file     Attends [employer] service: Not on file     Active member of club or organization: Not on file     Attends meetings of clubs or organizations: Not on file     Relationship status: Not on file   Other Topics Concern    Not on file   Social History Narrative    Not on file     No family history on file.  Review of patient's allergies indicates:  No Known Allergies    Current Outpatient Medications:     gabapentin (NEURONTIN) 400 MG capsule, , Disp: , Rfl:     ibuprofen (ADVIL,MOTRIN) 800 MG tablet, Take 800 mg by mouth 3 (three) times daily., Disp: , Rfl:     naproxen (NAPROSYN) 500 MG tablet, Take 1 tablet (500 mg total) by mouth 2 (two) times daily with meals., Disp: 60 tablet, Rfl: 0    omeprazole (PRILOSEC) 20 MG capsule, , Disp: , Rfl:     tiZANidine (ZANAFLEX) 4 MG tablet, Take 1 tablet (4 mg total) by mouth every 8 (eight) hours as needed (muscle spasms)., Disp: 40 tablet, Rfl: 0    Vitals:    07/16/19 1402   BP: 109/72   BP Location: Left arm   Patient Position: Sitting   BP Method: Large (Automatic)   Pulse: 70   Weight: 83.4 kg (183 lb 15.6 oz)   Height: 5' 10" (1.778 m)       Physical Exam   Constitutional: He is oriented to person, place, and time. He appears well-developed and well-nourished.   HENT:   Head: Normocephalic and atraumatic.   Eyes: Pupils are equal, round, and reactive to light.   Neck: Normal range of motion. Neck supple.   Cardiovascular: Normal rate.   Pulmonary/Chest: Effort normal.   Abdominal: He exhibits no distension.   Musculoskeletal: Normal range of motion. He exhibits no edema.   Neurological: He is alert and oriented to person, place, and time. He has a normal Finger-Nose-Finger Test, a normal Heel to Shin Test, a normal Romberg Test and a normal Tandem Gait Test. Gait normal.   Reflex Scores:       Tricep reflexes are 2+ on the right side and 2+ on the left side.       Bicep reflexes are 2+ on the right side and 2+ " on the left side.       Brachioradialis reflexes are 2+ on the right side and 2+ on the left side.       Patellar reflexes are 2+ on the right side and 2+ on the left side.       Achilles reflexes are 2+ on the right side and 2+ on the left side.  Skin: Skin is warm and dry.   Psychiatric: He has a normal mood and affect. His speech is normal and behavior is normal. Judgment and thought content normal.   Nursing note and vitals reviewed.      Neurologic Exam     Mental Status   Oriented to person, place, and time.   Oriented to person.   Oriented to place.   Oriented to time.   Follows 3 step commands.   Attention: normal. Concentration: normal.   Speech: speech is normal   Level of consciousness: alert  Knowledge: consistent with education.   Able to name object. Able to read. Able to repeat. Able to write. Normal comprehension.     Cranial Nerves     CN II   Visual acuity: normal  Right visual field deficit: none  Left visual field deficit: none     CN III, IV, VI   Pupils are equal, round, and reactive to light.  Right pupil: Size: 3 mm. Shape: regular. Reactivity: brisk. Consensual response: intact.   Left pupil: Size: 3 mm. Shape: regular. Reactivity: brisk. Consensual response: intact.   CN III: no CN III palsy  CN VI: no CN VI palsy  Nystagmus: none   Diplopia: none  Ophthalmoparesis: none  Conjugate gaze: present    CN V   Right facial sensation deficit: none  Left facial sensation deficit: none    CN VII   Right facial weakness: none  Left facial weakness: none    CN VIII   Hearing: intact    CN IX, X   CN IX normal.   CN X normal.     CN XI   Right sternocleidomastoid strength: normal  Left sternocleidomastoid strength: normal  Right trapezius strength: normal  Left trapezius strength: normal    CN XII   Fasciculations: absent  Tongue deviation: none    Motor Exam   Muscle bulk: normal  Overall muscle tone: normal  Right arm pronator drift: absent  Left arm pronator drift: absent    Strength   Right neck  flexion: 5/5  Left neck flexion: 5/5  Right neck extension: 5/5  Left neck extension: 5/5  Right deltoid: 5/5  Left deltoid: 5/5  Right biceps: 5/5  Left biceps: 5/5  Right triceps: 5/5  Left triceps: 5/5  Right wrist flexion: 5/5  Left wrist flexion: 5/5  Right wrist extension: 5/5  Left wrist extension: 5/5  Right interossei: 5/5  Left interossei: 5/5  Right abdominals: 5/5  Left abdominals: 5/  Right iliopsoas: 5/5  Left iliopsoas: 5/  Right quadriceps: 5/  Left quadriceps: /  Right hamstrin/5  Left hamstrin/5  Right glutei:   Left glutei:   Right anterior tibial:   Left anterior tibial:   Right posterior tibial:   Left posterior tibial:   Right peroneal:   Left peroneal:   Right gastroc:   Left gastroc: lantar flexion  while seated, but there is weakness detected with attempting to toe walk.     Sensory Exam   Right arm light touch: normal  Left arm light touch: normal  Right leg light touch: normal  Left leg light touch: normal  Right arm vibration: normal  Left arm vibration: normal  Right arm pinprick: normal  Left arm pinprick: normal    Gait, Coordination, and Reflexes     Gait  Gait: normal    Coordination   Romberg: negative  Finger to nose coordination: normal  Heel to shin coordination: normal  Tandem walking coordination: normal    Tremor   Resting tremor: absent  Intention tremor: absent  Action tremor: absent    Reflexes   Right brachioradialis: 2+  Left brachioradialis: 2+  Right biceps: 2+  Left biceps: 2+  Right triceps: 2+  Left triceps: 2+  Right patellar: 2+  Left patellar: 2+  Right achilles: 2+  Left achilles: 2+  Right Swenson: absent  Left Swenson: absent  Right ankle clonus: absent  Left ankle clonus: absent      Provider dictation:  54 year old male who is relatively healthy presents for follow up evaluation of lower back and left leg pain after receiving imaging, undergoing PT and nerve testing.  He was injured while moving heavy equipment  at work 4-4-19.  He was moving ortega beams and felt a sharp pain in the lower back.  He initially filed under workers comp, but was denied.  He presents on private insurance today.  He has pain in the lower back with radiation to the left buttock and left leg.  He feels numbness/ tingling in the left calf and plantar surface of the foot.  He was seen in the ER due to severity of pain.  He has tried flexeril, gabapentin, advil and naproxen.  Physical therapy is worsening pain.  He paid out of pocket for an MRI lumbar spine.   Oswestry score: 64%.  PHQ:  6.    On exam, he has full 5/5 strength while seated throughout, but is unable to toe walk on the left side.  He has 2+ muscle stretch reflexes throughout and no sensory deficits.    I have reviewed an MRI lumbar spine from 5-17-19 demonstrating L5/S1 very small central/ left paracentral disk hernia with minimal/ mild left foraminal narrowing.    EMG/ NCV test 6-18-19 demonstrated mild-moderate chronic bilateral L4, L5 radiculopathy with no active denervation      Mr. Yip has acute onset lower back and left leg radiculopathy.  Symptoms are not supported by imaging or nerve testing with mild L5/S1 disk hernia and mild at most foraminal narrowing; and no active denervation detected on nerve testing.   I have previously discussed his case with Dr. Santa and Dr. Stallings - both recommended no surgical intervention with a such a small disk hernia, unless active radiculopathy was detected on nerve testing, which it was not.  Therefore, recommend continuing with PT. He can consider CORTNEY if he can find a pain management provider that accepts medicaid.  He has an appointment with Dr. Ramos (pain management) and Dr. Magaña ([facility] neurosurgery) in December for further opinions.  If insurance status changes or if workers comp picks up his case, we can consider further treatment with referral to pain management here at [facility].    Visit Diagnosis:  Lumbar  radiculopathy        Total time spent counseling greater than fifty percent of total visit time.  Counseling included discussion regarding imaging findings, diagnosis possibilities, treatment options, risks and benefits.   The patient had many questions regarding the options and long-term effects.

## 2020-04-15 NOTE — PROGRESS NOTES
Outpatient Therapy Discharge Summary     Name: Porter Yip  Clinic Number: [id number]    Therapy Diagnosis:   Encounter Diagnosis   Name Primary?    Low back pain, unspecified back pain laterality, unspecified chronicity, with sciatica presence unspecified Yes     Physician: Michelle Bernal PA-C    Physician Orders: eval and treat  Medical Diagnosis: LBP  Evaluation Date: 5/30/19      Date of Last visit: 7/1/19  Total Visits Received: 7  Cancelled Visits: 7  No Show Visits: 0    Assessment    Goals: Not met.    Discharge reason: Patient has not attended therapy since 7/1/19    Plan   This patient is discharged from Physical Therapy due to nonattendance.

## 2021-05-25 ENCOUNTER — HOSPITAL ENCOUNTER (EMERGENCY)
Facility: HOSPITAL | Age: 56
Discharge: HOME | End: 2021-05-25
Attending: EMERGENCY MEDICINE
Payer: MEDICAID

## 2021-05-25 VITALS
BODY MASS INDEX: 25.05 KG/M2 | RESPIRATION RATE: 18 BRPM | DIASTOLIC BLOOD PRESSURE: 83 MMHG | HEART RATE: 80 BPM | TEMPERATURE: 98 F | OXYGEN SATURATION: 99 % | WEIGHT: 175 LBS | HEIGHT: 70 IN | SYSTOLIC BLOOD PRESSURE: 133 MMHG

## 2021-05-25 DIAGNOSIS — M54.50 ACUTE MIDLINE LOW BACK PAIN WITHOUT SCIATICA: Primary | ICD-10-CM

## 2021-05-25 PROCEDURE — 63600175 PHARM REV CODE 636 W HCPCS: Performed by: EMERGENCY MEDICINE

## 2021-05-25 PROCEDURE — 99284 EMERGENCY DEPT VISIT MOD MDM: CPT | Mod: 25

## 2021-05-25 PROCEDURE — 96372 THER/PROPH/DIAG INJ SC/IM: CPT

## 2021-05-25 RX ORDER — MORPHINE SULFATE 4 MG/ML
8 INJECTION, SOLUTION INTRAMUSCULAR; INTRAVENOUS
Status: COMPLETED | OUTPATIENT
Start: 2021-05-25 | End: 2021-05-25

## 2021-05-25 RX ORDER — HYDROCODONE BITARTRATE AND ACETAMINOPHEN 5; 325 MG/1; MG/1
1 TABLET ORAL EVERY 6 HOURS PRN
Qty: 12 TABLET | Refills: 0 | Status: SHIPPED | OUTPATIENT
Start: 2021-05-25

## 2021-05-25 RX ADMIN — MORPHINE SULFATE 8 MG: 4 INJECTION, SOLUTION INTRAMUSCULAR; INTRAVENOUS at 07:05

## 2024-02-19 ENCOUNTER — PATIENT MESSAGE (OUTPATIENT)
Dept: FAMILY MEDICINE | Facility: CLINIC | Age: 59
End: 2024-02-19
Payer: MEDICAID

## 2024-02-19 ENCOUNTER — OFFICE VISIT (OUTPATIENT)
Dept: FAMILY MEDICINE | Facility: CLINIC | Age: 59
End: 2024-02-19
Payer: MEDICAID

## 2024-02-19 VITALS
SYSTOLIC BLOOD PRESSURE: 120 MMHG | DIASTOLIC BLOOD PRESSURE: 70 MMHG | WEIGHT: 180 LBS | HEART RATE: 74 BPM | RESPIRATION RATE: 18 BRPM | BODY MASS INDEX: 25.77 KG/M2 | OXYGEN SATURATION: 98 % | HEIGHT: 70 IN | TEMPERATURE: 98 F

## 2024-02-19 DIAGNOSIS — C44.91 BASAL CELL ADENOCARCINOMA: ICD-10-CM

## 2024-02-19 DIAGNOSIS — M51.26 HERNIATED LUMBAR INTERVERTEBRAL DISC: ICD-10-CM

## 2024-02-19 DIAGNOSIS — Z87.442 HISTORY OF KIDNEY STONES: ICD-10-CM

## 2024-02-19 DIAGNOSIS — M54.42 CHRONIC BILATERAL LOW BACK PAIN WITH LEFT-SIDED SCIATICA: Primary | ICD-10-CM

## 2024-02-19 DIAGNOSIS — G89.29 CHRONIC BILATERAL LOW BACK PAIN WITH LEFT-SIDED SCIATICA: Primary | ICD-10-CM

## 2024-02-19 DIAGNOSIS — Z23 IMMUNIZATION DUE: ICD-10-CM

## 2024-02-19 DIAGNOSIS — Z00.00 PREVENTATIVE HEALTH CARE: ICD-10-CM

## 2024-02-19 DIAGNOSIS — N52.1 ERECTILE DYSFUNCTION DUE TO DISEASES CLASSIFIED ELSEWHERE: ICD-10-CM

## 2024-02-19 DIAGNOSIS — Z12.11 COLON CANCER SCREENING: ICD-10-CM

## 2024-02-19 DIAGNOSIS — K21.9 GASTROESOPHAGEAL REFLUX DISEASE, UNSPECIFIED WHETHER ESOPHAGITIS PRESENT: Primary | ICD-10-CM

## 2024-02-19 DIAGNOSIS — M54.16 LUMBAR RADICULOPATHY: ICD-10-CM

## 2024-02-19 DIAGNOSIS — H93.13 TINNITUS OF BOTH EARS: ICD-10-CM

## 2024-02-19 PROCEDURE — 90471 IMMUNIZATION ADMIN: CPT | Mod: PBBFAC,PN

## 2024-02-19 PROCEDURE — 99999 PR PBB SHADOW E&M-EST. PATIENT-LVL V: CPT | Mod: PBBFAC,,, | Performed by: FAMILY MEDICINE

## 2024-02-19 PROCEDURE — 3008F BODY MASS INDEX DOCD: CPT | Mod: CPTII,,, | Performed by: FAMILY MEDICINE

## 2024-02-19 PROCEDURE — 99386 PREV VISIT NEW AGE 40-64: CPT | Mod: S$PBB,,, | Performed by: FAMILY MEDICINE

## 2024-02-19 PROCEDURE — 1159F MED LIST DOCD IN RCRD: CPT | Mod: CPTII,,, | Performed by: FAMILY MEDICINE

## 2024-02-19 PROCEDURE — 90715 TDAP VACCINE 7 YRS/> IM: CPT | Mod: PBBFAC,PN

## 2024-02-19 PROCEDURE — 3044F HG A1C LEVEL LT 7.0%: CPT | Mod: CPTII,,, | Performed by: FAMILY MEDICINE

## 2024-02-19 PROCEDURE — 3074F SYST BP LT 130 MM HG: CPT | Mod: CPTII,,, | Performed by: FAMILY MEDICINE

## 2024-02-19 PROCEDURE — 99215 OFFICE O/P EST HI 40 MIN: CPT | Mod: PBBFAC,PN,25 | Performed by: FAMILY MEDICINE

## 2024-02-19 PROCEDURE — 99999PBSHW TDAP VACCINE GREATER THAN OR EQUAL TO 7YO IM: Mod: PBBFAC,,,

## 2024-02-19 PROCEDURE — 3078F DIAST BP <80 MM HG: CPT | Mod: CPTII,,, | Performed by: FAMILY MEDICINE

## 2024-02-19 RX ORDER — GABAPENTIN 800 MG/1
1 TABLET ORAL
COMMUNITY

## 2024-02-19 RX ORDER — OXYCODONE AND ACETAMINOPHEN 7.5; 325 MG/1; MG/1
TABLET ORAL
COMMUNITY

## 2024-02-19 RX ORDER — PANTOPRAZOLE SODIUM 40 MG/1
40 TABLET, DELAYED RELEASE ORAL DAILY
Qty: 30 TABLET | Refills: 11 | Status: SHIPPED | OUTPATIENT
Start: 2024-02-19 | End: 2025-02-18

## 2024-02-19 RX ORDER — SILDENAFIL 100 MG/1
100 TABLET, FILM COATED ORAL DAILY PRN
Qty: 20 TABLET | Refills: 3 | Status: SHIPPED | OUTPATIENT
Start: 2024-02-19 | End: 2025-02-18

## 2024-02-19 RX ORDER — MELOXICAM 15 MG/1
TABLET ORAL
COMMUNITY

## 2024-02-19 NOTE — PROGRESS NOTES
Subjective:       Patient ID: Proter Yip is a 58 y.o. male.    Chief Complaint: Establish Care (Pt has spot on shoulder. Derm referral. ), Medication Refill, and Hip Pain      Is here to get established.  Has been followed at [facility] in the past for squamous and basal cell carcinomas  Patient Active Problem List:     Herniated lumbar intervertebral disc- s/p rhizotomy for sacroiliitis     Lumbar radiculopathy     Low back pain  Patient has had a history of squamous and basal cell carcinomas on the shoulder and face needs referral to Dermatology has a history of tinnitus.      Hip Pain   The incident occurred more than 1 week ago (since 2019).     Allergies and Medications:   Review of patient's allergies indicates:  No Known Allergies  Current Outpatient Medications   Medication Sig Dispense Refill    ibuprofen (ADVIL,MOTRIN) 800 MG tablet Take 800 mg by mouth 3 (three) times daily.      omeprazole (PRILOSEC) 20 MG capsule       gabapentin (NEURONTIN) 800 MG tablet 1 capsule.      HYDROcodone-acetaminophen (NORCO) 5-325 mg per tablet Take 1 tablet by mouth every 6 (six) hours as needed for Pain. (Patient not taking: Reported on 2/19/2024) 12 tablet 0    meloxicam (MOBIC) 15 MG tablet 1 tablet Orally Once a day for 30 day(s)      naproxen (NAPROSYN) 500 MG tablet Take 1 tablet (500 mg total) by mouth 2 (two) times daily with meals. (Patient not taking: Reported on 2/19/2024) 60 tablet 0    oxyCODONE-acetaminophen (PERCOCET) 7.5-325 mg per tablet 1 tablet as needed; for breakthrough pain, more than 7 days medically necessary Orally every 6 hrs for 30 days      sildenafiL (VIAGRA) 100 MG tablet Take 1 tablet (100 mg total) by mouth daily as needed for Erectile Dysfunction. 20 tablet 3     No current facility-administered medications for this visit.       Family History:   History reviewed. No pertinent family history.    Social History:   Social History     Socioeconomic History    Marital status:  Other   Tobacco Use    Smoking status: Never    Smokeless tobacco: Never   Substance and Sexual Activity    Alcohol use: Yes    Drug use: Never    Sexual activity: Yes     Partners: Female   Social History Narrative    ** Merged History Encounter **            Review of Systems   HENT:  Positive for tinnitus.    Genitourinary:         ED   Musculoskeletal:  Positive for back pain and joint swelling.        Chronic low back pain and sacroiliitis precipitated by a back injury years ago.       Objective:     Vitals:    02/19/24 0822   BP: 120/70   Pulse: 74   Resp: 18   Temp: 98.4 °F (36.9 °C)        Physical Exam  Vitals and nursing note reviewed.   Constitutional:       Appearance: He is well-developed. He is not diaphoretic.   HENT:      Head: Normocephalic.   Eyes:      Conjunctiva/sclera: Conjunctivae normal.      Pupils: Pupils are equal, round, and reactive to light.   Cardiovascular:      Rate and Rhythm: Normal rate and regular rhythm.      Heart sounds: Normal heart sounds. No murmur heard.     No friction rub. No gallop.   Pulmonary:      Effort: Pulmonary effort is normal. No respiratory distress.      Breath sounds: Normal breath sounds. No stridor. No wheezing, rhonchi or rales.   Chest:      Chest wall: No tenderness.   Abdominal:      General: There is no distension.      Palpations: There is no mass.      Tenderness: There is no abdominal tenderness. There is no left CVA tenderness, guarding or rebound.      Hernia: No hernia is present.   Musculoskeletal:      Right lower leg: No edema.      Left lower leg: No edema.   Skin:     General: Skin is warm and dry.   Psychiatric:         Behavior: Behavior normal.         Thought Content: Thought content normal.         Judgment: Judgment normal.         Assessment:       1. Chronic bilateral low back pain with left-sided sciatica    2. Preventative health care    3. Colon cancer screening    4. History of kidney stones    5. Basal cell adenocarcinoma    6.  Erectile dysfunction due to diseases classified elsewhere    7. Herniated lumbar intervertebral disc    8. Lumbar radiculopathy    9. Immunization due    10. Tinnitus of both ears        Plan:       Porter Tan was seen today for establish care, medication refill and hip pain.    Diagnoses and all orders for this visit:    Chronic bilateral low back pain with left-sided sciatica    Preventative health care  -     HEPATITIS C ANTIBODY; Future  -     LIPID PANEL; Future  -     HIV 1/2 Ag/Ab (4th Gen); Future  -     HEMOGLOBIN A1C; Future  -     Comprehensive Metabolic Panel; Future    Colon cancer screening  -     Ambulatory referral/consult to Gastroenterology; Future    History of kidney stones  -     Vitamin D; Future    Basal cell adenocarcinoma  -     Ambulatory referral/consult to Dermatology; Future    Erectile dysfunction due to diseases classified elsewhere  -     sildenafiL (VIAGRA) 100 MG tablet; Take 1 tablet (100 mg total) by mouth daily as needed for Erectile Dysfunction.    Herniated lumbar intervertebral disc    Lumbar radiculopathy    Immunization due  -     (In Office Administered) Tdap Vaccine    Tinnitus of both ears  -     Ambulatory referral/consult to ENT; Future         Follow up in about 6 months (around 8/19/2024) for annual.

## 2024-02-20 ENCOUNTER — LAB VISIT (OUTPATIENT)
Dept: LAB | Facility: HOSPITAL | Age: 59
End: 2024-02-20
Attending: FAMILY MEDICINE
Payer: MEDICAID

## 2024-02-20 DIAGNOSIS — Z00.00 PREVENTATIVE HEALTH CARE: ICD-10-CM

## 2024-02-20 DIAGNOSIS — Z87.442 HISTORY OF KIDNEY STONES: ICD-10-CM

## 2024-02-20 LAB
25(OH)D3+25(OH)D2 SERPL-MCNC: 26 NG/ML (ref 30–96)
ALBUMIN SERPL BCP-MCNC: 4 G/DL (ref 3.5–5.2)
ALP SERPL-CCNC: 78 U/L (ref 55–135)
ALT SERPL W/O P-5'-P-CCNC: 14 U/L (ref 10–44)
ANION GAP SERPL CALC-SCNC: 7 MMOL/L (ref 8–16)
AST SERPL-CCNC: 19 U/L (ref 10–40)
BILIRUB SERPL-MCNC: 0.4 MG/DL (ref 0.1–1)
BUN SERPL-MCNC: 12 MG/DL (ref 6–20)
CALCIUM SERPL-MCNC: 9.5 MG/DL (ref 8.7–10.5)
CHLORIDE SERPL-SCNC: 106 MMOL/L (ref 95–110)
CHOLEST SERPL-MCNC: 164 MG/DL (ref 120–199)
CHOLEST/HDLC SERPL: 4 {RATIO} (ref 2–5)
CO2 SERPL-SCNC: 29 MMOL/L (ref 23–29)
CREAT SERPL-MCNC: 0.9 MG/DL (ref 0.5–1.4)
EST. GFR  (NO RACE VARIABLE): >60 ML/MIN/1.73 M^2
ESTIMATED AVG GLUCOSE: 111 MG/DL (ref 68–131)
GLUCOSE SERPL-MCNC: 90 MG/DL (ref 70–110)
HBA1C MFR BLD: 5.5 % (ref 4–5.6)
HCV AB SERPL QL IA: NORMAL
HDLC SERPL-MCNC: 41 MG/DL (ref 40–75)
HDLC SERPL: 25 % (ref 20–50)
HIV 1+2 AB+HIV1 P24 AG SERPL QL IA: NORMAL
LDLC SERPL CALC-MCNC: 106.2 MG/DL (ref 63–159)
NONHDLC SERPL-MCNC: 123 MG/DL
POTASSIUM SERPL-SCNC: 5.1 MMOL/L (ref 3.5–5.1)
PROT SERPL-MCNC: 7 G/DL (ref 6–8.4)
SODIUM SERPL-SCNC: 142 MMOL/L (ref 136–145)
TRIGL SERPL-MCNC: 84 MG/DL (ref 30–150)

## 2024-02-20 PROCEDURE — 83036 HEMOGLOBIN GLYCOSYLATED A1C: CPT | Performed by: FAMILY MEDICINE

## 2024-02-20 PROCEDURE — 80053 COMPREHEN METABOLIC PANEL: CPT | Performed by: FAMILY MEDICINE

## 2024-02-20 PROCEDURE — 80061 LIPID PANEL: CPT | Performed by: FAMILY MEDICINE

## 2024-02-20 PROCEDURE — 87389 HIV-1 AG W/HIV-1&-2 AB AG IA: CPT | Performed by: FAMILY MEDICINE

## 2024-02-20 PROCEDURE — 86803 HEPATITIS C AB TEST: CPT | Performed by: FAMILY MEDICINE

## 2024-02-20 PROCEDURE — 36415 COLL VENOUS BLD VENIPUNCTURE: CPT | Performed by: FAMILY MEDICINE

## 2024-02-20 PROCEDURE — 82306 VITAMIN D 25 HYDROXY: CPT | Performed by: FAMILY MEDICINE

## 2024-02-21 ENCOUNTER — TELEPHONE (OUTPATIENT)
Dept: FAMILY MEDICINE | Facility: CLINIC | Age: 59
End: 2024-02-21
Payer: MEDICAID

## 2024-02-21 DIAGNOSIS — E55.9 VITAMIN D DEFICIENCY: Primary | ICD-10-CM

## 2024-02-21 RX ORDER — ERGOCALCIFEROL 1.25 MG/1
50000 CAPSULE ORAL
Qty: 4 CAPSULE | Refills: 11 | Status: SHIPPED | OUTPATIENT
Start: 2024-02-21 | End: 2025-02-20

## 2024-02-21 NOTE — TELEPHONE ENCOUNTER
----- Message from Barry Akbar MD sent at 2/21/2024  8:03 AM CST -----  Vitamin-D level is low.  We will initiate vitamin-D 59038 units per week, and recheck vitamin-D in 3 months.  I will send in orders.

## 2024-02-21 NOTE — PROGRESS NOTES
Vitamin-D level is low.  We will initiate vitamin-D 66658 units per week, and recheck vitamin-D in 3 months.  I will send in orders.

## 2024-02-22 ENCOUNTER — TELEPHONE (OUTPATIENT)
Dept: FAMILY MEDICINE | Facility: CLINIC | Age: 59
End: 2024-02-22
Payer: MEDICAID

## 2024-02-22 ENCOUNTER — PATIENT MESSAGE (OUTPATIENT)
Dept: FAMILY MEDICINE | Facility: CLINIC | Age: 59
End: 2024-02-22
Payer: MEDICAID

## 2024-02-23 ENCOUNTER — TELEPHONE (OUTPATIENT)
Dept: FAMILY MEDICINE | Facility: CLINIC | Age: 59
End: 2024-02-23
Payer: MEDICAID

## 2025-08-20 ENCOUNTER — PATIENT MESSAGE (OUTPATIENT)
Dept: ADMINISTRATIVE | Facility: HOSPITAL | Age: 60
End: 2025-08-20
Payer: OTHER MISCELLANEOUS